# Patient Record
Sex: FEMALE | Race: WHITE
[De-identification: names, ages, dates, MRNs, and addresses within clinical notes are randomized per-mention and may not be internally consistent; named-entity substitution may affect disease eponyms.]

---

## 2017-04-08 NOTE — ED HEADACHE
General


Chief Complaint:  Cardiac/General Problems


Stated Complaint:  R HEAD PAIN


Nursing Triage Note:  


C/O HIGH BLOOD PRESSURE, RIGHT SIDED HEADACHE X1HR.


Nursing Sepsis Screen:  No Definite Risk


Source:  patient





History of Present Illness


Time seen by provider:  20:15


Initial Comments


PT C/O RIGHT SIDED HEADACHE AND RIGHT LATERAL NECK PAIN SINCE WAKING AT 1500 

TODAY


STATES HER BP /63. TAKES TOPROL 1/2 PILL BID AND TOOK EVENING DOSE ONE 

HOUR AGO


HAS NOT TAKEN ANYTHING FOR HER HEADACHE


NO VISION CHANGES


NO NAUSEA/VOMITING


NO DIZZINESS


NO PARESTHESIAS OR MOTOR DEFICITS


NO FEVER OR RECENT ILLNESS, URI SYMPTOMS, ETC. 


PT STATES SHE JUST GOT BACK FROM A LONG TRIP--WENT TO [place] 4 DAYS AGO, DROVE 8 

HOURS BACK HERE AND GOT HOME AT 0600 THIS AM---HAD BEEN UP FOR 24 HOURS AT THAT 

TIME. PT WENT TO BED AT 0700 WHEN SHE GOT HOME THIS AM, AND WOKE UP AT 1500 

WITH HEADACHE AND RIGHT LATERAL NECK PAIN 


STATES SHE HAS ALSO BEEN UNDER ALOT OF STRESS--ANNIVERSARY OF HER SISTER'S 

DEATH IN [place] A YEAR AGO. SON ALSO COMMITTED SUICIDE ( PT DID NOT STATE WHEN 

THIS OCCURRED) 


PT HAS HAD HEADACHES SIMILAR TO THIS IN  PAST





PCP: DR. ADOLFO ALLEN


ALSO HAS DR IN [place], WHERE SHE LIVES PART OF THE TIME.





Allergies and Home Medications


Allergies


Coded Allergies:  


     hydrocodone (Verified  Allergy, Unknown, 12/4/16)


     morphine (Verified  Allergy, Unknown, 12/4/16)


Uncoded Allergies:  


     MULTIPLE ANTIBIOTICS (Allergy, Unknown, 12/4/16)





Home Medications


Clopidogrel Bisulfate 75 Mg Tablet, #30 (Reported)


Metoprolol Tartrate 50 Mg Tablet, #60 (Reported)





Constitutional:  no symptoms reported, No dizziness, No fever


Eyes:  No Symptoms Reported


Ears, Nose, Mouth, Throat:  no symptoms reported


Respiratory:  no symptoms reported


Cardiovascular:  no symptoms reported


Gastrointestinal:  no symptoms reported, No nausea, No vomiting


Genitourinary:  no symptoms reported


Musculoskeletal:  see HPI


Skin:  no symptoms reported


Psychiatric/Neurological:  See HPI, Anxiety, Depressed, Emotional Problems, 

Headache





Past Medical-Social-Family Hx


Patient Social History


Alcohol Use:  Denies Use


Recreational Drug Use:  No


Smoking Status:  Current Everyday Smoker (1 PPD)


Type Used:  Cigarettes


2nd Hand Smoke Exposure:  Yes


Recent Foreign Travel:  No


Contact w/Someone Who Travel:  No


Recent Infectious Disease Expo:  No


Recent Hopitalizations:  No





Immunizations Up To Date


Tetanus Booster (TDap):  Unknown





Seasonal Allergies


Seasonal Allergies:  No





Surgeries


HX Surgeries:  Yes (BILATERAL ILIAC STENTS AND AORTIC STENT; ; BREAST BIOPSY;  

L KNEE)


Surgeries:  Appendectomy, Breast, Orthopedic, Vascular Surgery





Respiratory


Hx Respiratory Disorders:  No





Cardiovascular


Hx Cardiac Disorders:  Yes (CAROTID DISEASE; ILIAC AND AORTIC STENTS)


Cardiac Disorders:  Coronary Artery Disease, High Cholesterol, Hypertension, 

Peripheral Vascular





Neurological


Hx Neurological Disorders:  Yes (occasional headaches.)


Neurological Disorders:  Headaches /Migraines





Reproductive System


Pregnant:  No


GYN History:  Menopausal





Genitourinary


Hx Genitourinary Disorders:  No





Gastrointestinal


Hx Gastrointestinal Disorders:  No





Musculoskeletal


Hx Musculoskeletal Disorders:  No





Endocrine


Hx Endocrine Disorders:  No





HEENT


HX ENT Disorders:  Yes


HEENT Disorders:  Cataract





Cancer


Hx Cancer:  No





Psychosocial


Hx Psychiatric Problems:  No





Integumentary


HX Skin/Integumentary Disorder:  No





Blood Transfusions


Hx Blood Disorders:  No





Family Medical History


Significant Family History:  No Pertinent Family Hx





Physical Exam


Vital Signs





Vital Sign - Last 12Hours








 4/8/17





 20:16


 


Temp 97.9


 


Pulse 90


 


Resp 18


 


B/P (MAP) 195/94


 


Pulse Ox 96


 


O2 Delivery Room Air





Capillary Refill : Less Than 3 Seconds


General Appearance:  WD/WN, no apparent distress, other (ANXIOUS; REEKS OF 

CIGARETTES)


HEENT:  PERRL/EOMI, normal ENT inspection (EXCEPT POOR DENTITION, MISSING TEETH)

, TMs normal, pharynx normal


Neck:  full range of motion, tender lateral (RIGHT LATERAL NECK MUSCLE 

TENDERNESS--PALPATION REPRODUCES PAIN )


Cardiovascular:  normal peripheral pulses, regular rate, rhythm, no JVD, no 

murmur


Respiratory:  normal breath sounds, no respiratory distress, no accessory 

muscle use


Gastrointestinal:  normal bowel sounds, non tender, soft


Back:  normal inspection, no CVA tenderness, no vertebral tenderness


Extremities:  normal range of motion, non-tender, normal inspection, no pedal 

edema, no calf tenderness, normal capillary refill


Psychiatric:  alert, oriented x 3


Crainal Nerves:  normal hearing, normal speech, PERRL


Coordination/Gait:  normal gait


Motor/Sensory:  no motor deficit, no sensory deficit, no pronator drift


Skin:  normal color, warm/dry





Progress/Results/Core Measures


Results/Orders


My Orders





Orders - ALVAREZ YEAGER DO


Ct Head Wo (4/8/17 20:16)


Clonidine  Tablet (Catapres  Tablet) (4/8/17 20:45)


Acetaminophen  Tablet (Tylenol  Tablet) (4/8/17 21:00)





Medications Given in ED





Current Medications








 Medications  Dose


 Ordered  Sig/Henrietta


 Route  Start Time


 Stop Time Status Last Admin


Dose Admin


 


 Acetaminophen  1,000 mg  ONCE  ONCE


 PO  4/8/17 21:00


 4/8/17 21:01 DC 4/8/17 20:59


1,000 MG


 


 Clonidine HCl  0.2 mg  ONCE  ONCE


 PO  4/8/17 20:45


 4/8/17 20:46 DC 4/8/17 20:36


0.2 MG








Vital Signs/I&O





Vital Sign - Last 12Hours








 4/8/17 4/8/17 4/8/17





 20:16 20:54 21:25


 


Temp 97.9  97.9


 


Pulse 90 72 71


 


Resp 18 16 18


 


B/P (MAP) 195/94 172/72 


 


Pulse Ox 96 95 96


 


O2 Delivery Room Air Room Air 














Blood Pressure Mean:  127








Progress Note :  


Progress Note


PT DECLINES ANY SHOTS OR RX'S--STATES SHE CANNOT TAKE NSAIDS DUE TO BEING ON 

PLAVIX AND SHE HAS HAD AN ALLERGIC REACTION WITH AN UNKNOWN MUSCLE RELAXANT. PT 

STATES THE ONLY THING SHE WANTS IS TYLENOL


PT CALMER, HEADACHE ALMOST GONE--STATES SHE FEELS MUCH BETTER, AT DISMISSAL


BP DOWN /63 PRIOR TO DISMISSAL





Diagnostic Imaging





Comments


CT HEAD--NO ACUTE PROCESS, CHRONIC CHANGES--PER RADIOLOGIST REPORT @ 2040


   Reviewed:  Reviewed by Me





Departure


Impression


Impression:  


 Primary Impression:  


 Tension headache


 Additional Impressions:  


 HTN (hypertension)


 ANXIETY DUE TO STRESS


Disposition:  01 HOME, SELF-CARE


Condition:  Improved





Departure-Patient Inst.


Referrals:  


ADOLFO ALLEN MD (PCP/Family)


Primary Care Physician


Patient Instructions:  Anxiety, Adult (DC), DASH Diet, High Blood Pressure (DC)

, Tension Headache (DC)





Add. Discharge Instructions:  


TAKE TYLENOL AS NEEDED FOR HEADACHE





TAKE YOUR BLOOD PRESSURE MEDICATIONS AS PRESCRIBED





FOLLOW UP WITH DR. ALLEN NEXT WEEK FOR RECHECK





All discharge instructions reviewed with patient and/or family. Voiced 

understanding.











ALVAREZ YEAGER DO Apr 8, 2017 20:41

## 2017-04-08 NOTE — DIAGNOSTIC IMAGING REPORT
INDICATION: Right-sided headaches



Noncontrast brain CT is performed with comparison to 12/4/16.



There are mild atrophic changes. There were no extra-axial fluid

collections. No intracranial hemorrhage. No intracranial mass or

mass effect. No midline shift. The ventricles are normal in size

and position. There is no acute parenchymal abnormality in the

brain. Calvarial windows were unremarkable.



IMPRESSION:



Mild chronic changes with no acute intracranial abnormality.



Dictated by:



Dictated on workstation # [id number]

## 2017-05-14 NOTE — XMS REPORT
[facility]

 Created on: 2017



Cora Chun

External Reference #: [id number]

: 1955

Sex: Female



Demographics







 Address  [address] [place] [address]  [id number]

 

 Preferred Language  Unknown

 

 Marital Status  Unknown

 

 Christian Affiliation  Unknown

 

 Race  Unknown

 

 Ethnic Group  Unknown





Author







 Author  ROSALINE ORR

 

 Organization  [facility] [place] [facility]

 

 Address  [address] [place]  [address]



 

 Phone  [phone]







Care Team Providers







 Care Team Member Name  Role  Phone

 

 ROSALINE ORR  Unavailable  [phone]







PROBLEMS







 Type  Condition  ICD9-CM Code  SCK54-YS Code  Onset Dates  Condition Status  
SNOMED Code

 

 Assessment  Contact dermatitis due to plants, except food, unspecified contact 
dermatitis type     L25.5  15 Sep, 2016  Active  [id number]







ALLERGIES







 Substance  Reaction  Event Type  Date  Status

 

 Valium  Unknown  Drug Allergy  15 Sep, 2016  Active

 

 SulfADIAZINE  Unknown  Drug Allergy  15 Sep, 2016  Active

 

 Pravastatin Sodium  Unknown  Drug Allergy  15 Sep, 2016  Active

 

 Penicillin G Sodium  Unknown  Drug Allergy  15 Sep, 2016  Active







SOCIAL HISTORY

No smoking Hx information available



PLAN OF CARE





VITAL SIGNS







 Height  63 in  2016-09-15

 

 Weight  174.4 lbs  2016-09-15

 

 Heart Rate  84 bpm  2016-09-15

 

 Respiratory Rate  22   2016-09-15

 

 BMI  30.89 kg/m2  2016-09-15

 

 Blood pressure systolic  150 mmHg  2016-09-15

 

 Blood pressure diastolic  78 mmHg  2016-09-15







MEDICATIONS







 Medication  Instructions  Dosage  Frequency  Start Date  End Date  Duration  
Status

 

 Metoprolol Succinate ER 50 MG  Orally Once a day  1 tablet  24h           
Active

 

 PredniSONE 20 MG  Orally 2 tablets daily x 5 days  1 tablet     15 Sep, 2016  
20 Sep, 2016  5 days  Active

 

 Plavix 75 MG  Orally Once a day  1 tablet  24h           Active

 

 Triamcinolone Acetonide 0.1 %  Externally Twice a day  1 application to 
affected area  12h  15 Sep, 2016     5 days  Active







RESULTS

No Results



PROCEDURES







 Procedure  Date Ordered  Related Diagnosis  Body Site

 

 Office Visit, New Pt., Level 3  Sept 15, 2016      







IMMUNIZATIONS

No Known Immunizations

## 2017-08-18 NOTE — DIAGNOSTIC IMAGING REPORT
INDICATION: Overdose.



TIME OF EXAM: 2022 hrs.



COMPARISON: No previous study is available for comparison at this

time.



FINDINGS: Heart size and pulmonary vasculature are within normal

limits, and the lungs are clear, bilaterally.  



IMPRESSION: Unremarkable chest.



Dictated by: 



  Dictated on workstation # [id number]

## 2017-08-18 NOTE — ED SYNCOPE
General


Chief Complaint:  Dizziness/Syncope


Stated Complaint:  BLACKED OUT


Nursing Triage Note:  


Ambulatory to ED 10 with family [facility]. [facility] reports that she was sitting down, noticed that she started not 


feeling well and "blacked out, but came right back to." Patient reports that 

she 


remembers all events and denies hitting her head. Patient reports that she was 


able to ambulate after and went back home with family, but wanted to get 

checked 


out. Patient reports feeling somewhat normal now.


Source of Information:  Patient, Old Records


Exam Limitations:  No Limitations





History of Present Illness


Time Seen by Provider:  19:45


Initial Comments


This 61-year-old woman presents to the emergency room with a near syncopal 

episode while at the [facility] tonight 20-30 minutes after taking her metoprolol 

dose.  She normally takes a half tablet and reports the tablet she took this 

evening was larger than one half.  It was approximately three fourths of a 

tablet.  She is sensitive to medications and wonders if this larger dose may 

have caused the symptoms.  She also reports eating poorly today, eating only 

one and a half donuts earlier in the day.  She denies diabetes or problems with 

blood sugar.  She feels much better now and is asymptomatic.  She was also 

feeling well prior to the episode.  She did not actually lose consciousness but 

came close.  She bumped her head on the screen in front of her but denies any 

head injury.  She does comment about having chest pain intermittently over the 

past 10 years on the right side.  She relates this to COPD.  She denies any 

change and denies chest pain at present.  She does have a history of carotid 

stenosis which she gets checked every 6 months.  She is actually due for repeat 

scans next week.  She reports her last cardiac catheter was 3 years ago in 

[place] without interventions.  She has a continuous tobacco user.  She has a 

vasculopath who has aortic and iliac stents.  Dr. Shashank Allen is her primary 

care provider.





Allergies and Home Medications


Allergies


Coded Allergies:  


     hydrocodone (Verified  Allergy, Unknown, 16)


     morphine (Verified  Allergy, Unknown, 16)


Uncoded Allergies:  


     MULTIPLE ANTIBIOTICS (Allergy, Unknown, 16)





Home Medications


Clopidogrel Bisulfate 75 Mg Tablet, #30 (Reported)


Metoprolol Tartrate 50 Mg Tablet, #60 (Reported)





Constitutional:  no symptoms reported


EENTM:  no symptoms reported


Respiratory:  see HPI


Cardiovascular:  see HPI


Gastrointestinal:  no symptoms reported


Genitourinary:  no symptoms reported


Pregnant:  No


Musculoskeletal:  no symptoms reported


Skin:  no symptoms reported


Psychiatric/Neurological:  See HPI





Past Medical-Social-Family Hx


Patient Social History


Alcohol Use:  Denies Use


Recreational Drug Use:  No


Smoking Status:  Current Everyday Smoker


Type Used:  Cigarettes


2nd Hand Smoke Exposure:  Yes


Recent Foreign Travel:  No


Contact w/Someone Who Travel:  No


Recent Infectious Disease Expo:  No


Recent Hopitalizations:  No





Immunizations Up To Date


Tetanus Booster (TDap):  Unknown





Seasonal Allergies


Seasonal Allergies:  No





Surgeries


HX Surgeries:  Yes (iliac and aortic stentsBILATERAL ILIAC STENTS AND AORTIC 

STENT; ; BREAST BIOPSY;  L KNEE)


Surgeries:  Appendectomy, Breast, Cardiac (cardiac catheterization), Orthopedic

, Vascular Surgery





Respiratory


Hx Respiratory Disorders:  Yes (tobaccoism)





Cardiovascular


Hx Cardiac Disorders:  Yes (CAROTID DISEASE; ILIAC AND AORTIC STENTS)


Cardiac Disorders:  Coronary Artery Disease, High Cholesterol, Hypertension, 

Peripheral Vascular





Neurological


Hx Neurological Disorders:  Yes (occasional headaches.)


Neurological Disorders:  Headaches /Migraines





Reproductive System


GYN History:  Menopausal





Genitourinary


Hx Genitourinary Disorders:  No





Gastrointestinal


Hx Gastrointestinal Disorders:  No





Musculoskeletal


Hx Musculoskeletal Disorders:  No





Endocrine


Hx Endocrine Disorders:  No





HEENT


HX ENT Disorders:  Yes


HEENT Disorders:  Cataract





Cancer


Hx Cancer:  No





Psychosocial


Hx Psychiatric Problems:  No





Integumentary


HX Skin/Integumentary Disorder:  No





Blood Transfusions


Hx Blood Disorders:  No





Family Medical History


Significant Family History:  No Pertinent Family Hx





Physical Exam


Vital Signs





Vital Sign - Last 12Hours








 /





 19:29


 


Temp 98.5


 


Pulse 75


 


Resp 16


 


B/P (MAP) 192/89


 


Pulse Ox 96


 


O2 Delivery Room Air





Capillary Refill : Less Than 3 Seconds


General Appearance:  No Apparent Distress


HEENT:  PERRL/EOMI, Normal ENT Inspection


Neck:  Normal Inspection, No Carotid Bruit


Cardiovascular:  Regular Rate, Rhythm, No Edema, No Murmur, Normal Peripheral 

Pulses, No JVD


Respiratory:  Chest Non Tender, Lungs Clear, Normal Breath Sounds, No Accessory 

Muscle Use, No Respiratory Distress


Gastrointestinal:  Normal Bowel Sounds, Non Tender, Soft


Extremities:  Normal Inspection, No Pedal Edema


Neurologic/Psychiatric:  Alert, Oriented x3, No Motor/Sensory Deficits, Normal 

Mood/Affect, CNs II-XII Norm as Tested


Cranial Nerves:  Normal Hearing, Normal Speech, PERRL


Coordination/Gait:  Normal Gait


Motor/Sensory:  No Motor Deficit, No Sensory Deficit, No Pronator Drift


Skin:  Normal Color, Warm/Dry





Progress/Results/Core Measures


Results/Orders


Lab Results





Laboratory Tests








Test


  17


20:03 17


20:15 Range/Units


 


 


Urine Color YELLOW    


 


Urine Clarity CLEAR    


 


Urine pH 6.5   5-9  


 


Urine Specific Gravity 1.010 L  1.016-1.022  


 


Urine Protein NEGATIVE   NEGATIVE  


 


Urine Glucose (UA) NEGATIVE   NEGATIVE  


 


Urine Ketones NEGATIVE   NEGATIVE  


 


Urine Nitrite NEGATIVE   NEGATIVE  


 


Urine Bilirubin NEGATIVE   NEGATIVE  


 


Urine Urobilinogen NORMAL   NORMAL  MG/DL


 


Urine Leukocyte Esterase NEGATIVE   NEGATIVE  


 


Urine RBC (Auto) NEGATIVE   NEGATIVE  


 


Urine RBC NONE    /HPF


 


Urine WBC NONE    /HPF


 


Urine Squamous Epithelial


Cells RARE 


  


   /HPF


 


 


Urine Crystals NONE    /LPF


 


Urine Bacteria NONE    /HPF


 


Urine Casts NONE    /LPF


 


Urine Mucus NEGATIVE    /LPF


 


Urine Culture Indicated NO    


 


White Blood Count


  


  11.7 H


  4.3-11.0


10^3/uL


 


Red Blood Count


  


  4.32 L


  4.35-5.85


10^6/uL


 


Hemoglobin  14.1  11.5-16.0  G/DL


 


Hematocrit  42  35-52  %


 


Mean Corpuscular Volume  96  80-99  FL


 


Mean Corpuscular Hemoglobin  33  25-34  PG


 


Mean Corpuscular Hemoglobin


Concent 


  34 


  32-36  G/DL


 


 


Red Cell Distribution Width  13.4  10.0-14.5  %


 


Platelet Count


  


  275 


  130-400


10^3/uL


 


Mean Platelet Volume  9.8  7.4-10.4  FL


 


Neutrophils (%) (Auto)  61  42-75  %


 


Lymphocytes (%) (Auto)  29  12-44  %


 


Monocytes (%) (Auto)  9  0-12  %


 


Eosinophils (%) (Auto)  1  0-10  %


 


Basophils (%) (Auto)  1  0-10  %


 


Neutrophils # (Auto)  7.1  1.8-7.8  X 10^3


 


Lymphocytes # (Auto)  3.4  1.0-4.0  X 10^3


 


Monocytes # (Auto)  1.0  0.0-1.0  X 10^3


 


Eosinophils # (Auto)


  


  0.2 


  0.0-0.3


10^3/uL


 


Basophils # (Auto)


  


  0.1 


  0.0-0.1


10^3/uL


 


Sodium Level  142  135-145  MMOL/L


 


Potassium Level  3.7  3.6-5.0  MMOL/L


 


Chloride Level  108 H   MMOL/L


 


Carbon Dioxide Level  21  21-32  MMOL/L


 


Anion Gap  13  5-14  MMOL/L


 


Blood Urea Nitrogen  9  7-18  MG/DL


 


Creatinine


  


  0.71 


  0.60-1.30


MG/DL


 


Estimat Glomerular Filtration


Rate 


  > 60 


   


 


 


BUN/Creatinine Ratio  13   


 


Glucose Level  95    MG/DL


 


Calcium Level  9.3  8.5-10.1  MG/DL


 


Magnesium Level  2.0  1.8-2.4  MG/DL


 


Total Bilirubin  0.5  0.1-1.0  MG/DL


 


Aspartate Amino Transf


(AST/SGOT) 


  15 


  5-34  U/L


 


 


Alanine Aminotransferase


(ALT/SGPT) 


  13 


  0-55  U/L


 


 


Alkaline Phosphatase  73    U/L


 


Troponin I  < 0.30  <0.30  NG/ML


 


Total Protein  7.6  6.4-8.2  GM/DL


 


Albumin  4.3  3.2-4.5  GM/DL








My Orders





Orders - JOSR ALEXANDRE MD


Chest 1 View, Ap/Pa Only (17 19:52)


Cbc With Automated Diff (17 19:52)


Comprehensive Metabolic Panel (17 19:52)


Magnesium (17 19:52)


Troponin I (17 19:52)


Ua Culture If Indicated (17 19:52)


Saline Lock/Iv-Start (17 19:52)


Ekg Tracing (17 19:52)


Monitor-Rhythm Ecg Trace Only (17 19:52)


Orthostatic Vital Signs (17 19:52)


Us Carotid Bradly Complete 60109 (17 20:49)


Acetaminophen  Tablet (Tylenol  Tablet) (17 22:15)


Acetaminophen  Tablet (Tylenol  Tablet) (17 22:03)





Medications Given in ED





Vital Signs/I&O





Vital Sign - Last 12Hours








 17





 19:29 20:29 22:09 22:11


 


Temp 98.5  98.5 98.5


 


Pulse 75 73  





  72  





  75  


 


Resp 16   


 


B/P (MAP) 192/89   


 


Pulse Ox 96   


 


O2 Delivery Room Air   


 


    





 17   





 22:38   


 


Temp 98.5   


 


Pulse 84   


 


Resp 18   


 


Pulse Ox 98   


 


O2 Delivery Room Air   














Blood Pressure Mean:  123








Progress Note :  


Progress Note


Workup was essentially unremarkable.  Patient had no further episodes of 

syncope or near syncope.  Case was reviewed with Dr. Shashank Allen who agrees 

with discharge home for outpatient follow-up.  Ultrasound showed no progression 

of stenosis.  Patient did remarkable about chest pain but she was very clear 

that this pain has been present intermittently for 10 years and has been worked 

up previously.  She denies any new chest pain.





ECG


Initial ECG Impression Date:  Aug 18, 2017


Initial ECG Impression Time:  20:09


Initial ECG Rate:  74


Initial ECG Rhythm:  Normal Sinus


Initial ECG Intervals:  Normal


Initial ECG Impression:  Normal


Comment


Normal sinus rhythm with no ST elevation or depression.  No abnormal intervals 

or axis deviation.





Diagnostic Imaging





   Diagonstic Imaging:  Xray


   Plain Films/CT/US/NM/MRI:  chest


Comments


Chest x-ray viewed by me and report reviewed.  See report below:





NAME:      TIFFANY ALVAREZ


MED REC#:   [id number]


ACCOUNT#:   [id number]


PT STATUS:   REG ER


:      1955


PHYSICIAN:    JOSR ALEXANDRE MD


ADMIT DATE:   17/ER


***Signed***


Date of Exam:   17





CHEST 1 VIEW, AP/PA ONLY


 


INDICATION: Overdose.





TIME OF EXAM: 2022 hrs.





COMPARISON: No previous study is available for comparison at this


time.





FINDINGS: Heart size and pulmonary vasculature are within normal


limits, and the lungs are clear, bilaterally.  





IMPRESSION: Unremarkable chest.





Dictated by: 





  Dictated on workstation # [id number]





[id number]





Dict:      17


Trans:      17





Interpreted by:         MARIO OVERTON MD


Electronically signed by:   MARIO OVERTON MD 17








   Diagonstic Imaging:  Ultrasound


Comments


Carotid ultrasound discussed with the technician and [facility] report reviewed.  

No significant stenosis on the right.  Stenosis on the left up to 80 percent 

unchanged from prior.





Departure


Impression


Impression:  


 Primary Impression:  


 Near syncope


Disposition:  01 HOME, SELF-CARE


Condition:  Improved





Departure-Patient Inst.


Decision time for Depature:  22:25


Referrals:  


SHASHANK ALLEN MD (PCP/Family)


Primary Care Physician


Patient Instructions:  Syncope (Fainting) (DC)





Add. Discharge Instructions:  


Drink plenty of clear liquids.  Work toward quitting smoking.  Follow-up with 

Dr. Allen as soon as possible.  Keep your appointments for the arterial scans 

next week and see your cardiologist as soon as possible.  Return to the 

emergency room if symptoms worsen.  











All discharge instructions reviewed with patient and/or family. Voiced 

understanding.





Copy


Copies To 1:   SHASHANK ALLEN MD, JOSHUA T MD Aug 18, 2017 22:31

## 2017-08-19 NOTE — DIAGNOSTIC IMAGING REPORT
PROCEDURE: US Carotid Duplex Bilateral.



TECHNIQUE: Multiple real-time grayscale images were obtained over

the carotid arteries in various projections bilaterally.

Additional duplex Doppler and color Doppler images were also

obtained.



INDICATION: Syncope



FINDINGS: Grayscale images demonstrate mild to moderate bilateral

carotid plaques, left greater than right. There are elevated

velocities in the left internal carotid artery up to 209 cm/s.

The ICA/CCA ratio on the left is 2.6. There are no focally

elevated velocities on the right. ICA/CCA ratio on the right is

1.6. There is antegrade flow in both vertebral arteries.



IMPRESSION: Spectral analysis correlates with a 60-79% stenosis

in the left internal carotid artery. Recommend clinical

correlation and if warranted followup with CTA.



No significant stenosis in the right internal carotid artery.



Dictated by: 



  Dictated on workstation # [id number]

## 2017-08-24 NOTE — DIAGNOSTIC IMAGING REPORT
EXAMINATION: Left hip at 11:28 a.m.



INDICATION: Hip pain.



AP and lateral views were obtained. There are no prior studies

available for comparison.



FINDINGS: There is no fracture, dislocation, or acute bony

abnormality evident. However, there is severe degenerative

disease involving the hip joint. Specifically, the superolateral

aspect of the joint space is narrowed, and there is sclerosis of

the opposing surfaces of the femoral head and the acetabulum.

Subchondral cyst formation within the acetabulum is noted as

well.



The soft tissues are unremarkable.



IMPRESSION:

1. There is no evidence for an acute bony abnormality.

2. There is severe degenerative disease involving the hip joint.



Dictated by: 



  Dictated on workstation # [id number]

## 2017-08-24 NOTE — DIAGNOSTIC IMAGING REPORT
EXAMINATION: Left knee at 11:29 p.m.



INDICATION: Knee pain.



Three views were obtained.



FINDINGS: There is no fracture, dislocation, or acute bony

abnormality evident. There is narrowing of the lateral

compartment of the knee joint and the patellofemoral space. The

medial compartment is fairly well maintained. The soft tissues

are unremarkable.



IMPRESSION:

1. There is no evidence for acute bony abnormality.

2. There is degenerative disease involving the lateral

compartment and the patellofemoral space.



Dictated by: 



  Dictated on workstation # [id number]

## 2017-12-10 NOTE — ED NEUROLOGICAL PROBLEM
General


Chief Complaint:  Neuro-Stroke Like Symptoms


Stated Complaint:  R SIDE NECK/HEAD PAIN/R EYE VISION BLURRY


Nursing Triage Note:  


pt reports pain in l side of neck, blurred vision while at the [employer] today. pt 


reports stints in aorta, stomach, L carotid, R and L sides of iliac six weeks 


ago.


Nursing Sepsis Screen:  No Definite Risk


Source:  patient


Exam Limitations:  no limitations





History of Present Illness


Time seen by provider:  12:02


Initial Comments


This 61-year-old woman presents to the emergency room with complaints of right-

sided blurry vision and pain in the right neck that radiates up toward the ear.

  Symptoms started about 35 minutes prior to arrival (about 11:00) while she 

was playing games at the [employer].  Her symptoms have improved some now but she 

still has some slight blurry vision.  She is uncertain how different this is 

then her chronic because she has cataracts and requires glasses.  She is not 

wearing her glasses at present.  She denies any field of vision changes.  She 

had a carotid stent placed about 6 weeks ago on the left.  Stroke activation 

was paged after my assessment as patient had right-sided blurry vision and a 

recent placement of a stent on the left.  Patient reports good compliance with 

her Plavix but she held aspirin last week due to GI upset.





Allergies and Home Medications


Allergies


Coded Allergies:  


     iodine (Verified  Allergy, Severe, 12/10/17)


     hydrocodone (Verified  Allergy, Unknown, 16)


     morphine (Verified  Allergy, Unknown, 16)


Uncoded Allergies:  


     IV DYE (Allergy, Severe, 12/10/17)


     MULTIPLE ANTIBIOTICS (Allergy, Unknown, 16)





Home Medications


Aspirin 81 Mg Tablet.dr, 81 MG PO, (Reported)


Clopidogrel Bisulfate 75 Mg Tablet, #30 (Reported)


Lisinopril 10 Mg Tablet, 10 MG PO DAILY, (Reported)


Metoprolol Tartrate 50 Mg Tablet, #60 (Reported)





Constitutional:  no symptoms reported


Eyes:  See HPI


Ears, Nose, Mouth, Throat:  no symptoms reported


Respiratory:  no symptoms reported


Cardiovascular:  see HPI


Gastrointestinal:  see HPI


Genitourinary:  no symptoms reported


Pregnant:  No


Musculoskeletal:  no symptoms reported


Skin:  no symptoms reported


Psychiatric/Neurological:  See HPI


Endocrine:  No Symptoms Reported


Hematologic/Lymphatic:  No Symptoms Reported





Past Medical-Social-Family Hx


Patient Social History


Alcohol Use:  Denies Use


Recreational Drug Use:  No


Smoking Status:  Current Everyday Smoker


Type Used:  Cigarettes


2nd Hand Smoke Exposure:  Yes


Recent Foreign Travel:  No


Contact w/Someone Who Travel:  No


Recent Infectious Disease Expo:  No


Recent Hopitalizations:  No


Physical Abuse:  No


Sexual Abuse:  No


Mistreated:  No


Fear:  No





Immunizations Up To Date


Tetanus Booster (TDap):  Unknown





Seasonal Allergies


Seasonal Allergies:  No





Surgeries


History of Surgeries:  Yes (BILATERAL ILIAC STENTS AND AORTIC STENT; ; BREAST 

BIOPSY;  L KNEE)


Surgeries:  Appendectomy, Breast, Cardiac, Orthopedic, Vascular Surgery (left 

CEA)





Respiratory


History of Respiratory Disorde:  Yes (tobaccoism)





Cardiovascular


History of Cardiac Disorders:  Yes (CAROTID DISEASE; ILIAC AND AORTIC STENTS)


Cardiac Disorders:  Coronary Artery Disease, High Cholesterol, Hypertension, 

Peripheral Vascular (carotid stenosis)





Neurological


History of Neurological Disord:  Yes (occasional headaches.)


Neurological Disorders:  Headaches /Migraines





Reproductive System


GYN History:  Menopausal





Genitourinary


History of Genitourinary Disor:  No





Gastrointestinal


History of Gastrointestinal Di:  No





Musculoskeletal


History of Musculoskeletal Dis:  No





Endocrine


History of Endocrine Disorders:  No





HEENT


History of HEENT Disorders:  Yes


HEENT Disorders:  Cataract





Cancer


History of Cancer:  No





Psychosocial


History of Psychiatric Problem:  No


Suicide Risk Score:  0





Integumentary


History of Skin or Integumenta:  No





Blood Transfusions


History of Blood Disorders:  No





Family Medical History


Significant Family History:  No Pertinent Family Hx





Physical Exam


Vital Signs





Vital Sign - Last 12Hours








 12/10/17





 12:00


 


Temp 98.3


 


Pulse 78


 


Resp 13


 


B/P (MAP) 179/86 (117)


 


Pulse Ox 99





Capillary Refill : Less Than 3 Seconds


General Appearance:  WD/WN, no apparent distress


HEENT:  PERRL/EOMI, normal ENT inspection, TMs normal, pharynx normal


Neck:  normal inspection


Respiratory:  lungs clear, normal breath sounds, no respiratory distress, no 

accessory muscle use


Cardiovascular:  regular rate, rhythm, no edema, no murmur


Gastrointestinal:  normal bowel sounds, non tender, soft


Extremities:  normal inspection, no pedal edema


Neurologic/Psychiatric:  CNs II-XII nml as tested, no motor/sensory deficits, 

alert, normal mood/affect, oriented x 3


Crainal Nerves:  normal hearing, normal speech, PERRL, other (peripheral vision 

is intact bilaterally with no field of vision deficits.)


Coordination/Gait:  normal finger to nose, normal gait


Motor/Sensory:  no motor deficit


Skin:  normal color, warm/dry





Stroke


Onset of Symptoms


Date of Onset of Symptoms:  Dec 10, 2017





NIH Stroke Scale Assessment





 Level of Consciousness: 0=Alert (0), Level of Consciousness-Questions: 0=

Answers both month/age (0), LOC Commands: 0=Performs both tasks (0), Visual 

Fields: 0=No visual loss (0), Facial Movement (Facial Paresis): 0=Normal 

symmetrical mnt (0), Motor Function-Arms Right: 0=No drift (0), Motor Function-

Arms Left: 0=No drift (0), Motor Function-Legs Right: 0=No drift (0), Motor 

Function-Legs Left: 0=No drift (0), Limb Ataxia: 0=Absent (0), Sensory: 0=Normal

:no loss (0), Best Language: 0=No aphasia (0), Dysarthria: 0=Normal (0), 

Extinction & Inattention: 0=No abnormality (0), Total: 0





Stroke Thrombolytic Exclusion


Age 18 or Over:  Yes


Intracranial Neoplasm/Aneurysm:  No


Recent CPR:  No


Diabetic Hemorrhagic Retinopat:  No


Recent Obstetric Delivery:  No


Significant Hepatic Dysfunctio:  No


Improving Symptoms:  No





Progress/Results/Core Measures


Results/Orders


Lab Results





Laboratory Tests








Test


  12/10/17


12:20 12/10/17


13:07 Range/Units


 


 


White Blood Count


  8.0 


  


  4.3-11.0


10^3/uL


 


Red Blood Count


  4.27 L


  


  4.35-5.85


10^6/uL


 


Hemoglobin 13.7   11.5-16.0  G/DL


 


Hematocrit 41   35-52  %


 


Mean Corpuscular Volume 97   80-99  FL


 


Mean Corpuscular Hemoglobin 32   25-34  PG


 


Mean Corpuscular Hemoglobin


Concent 33 


  


  32-36  G/DL


 


 


Red Cell Distribution Width 13.5   10.0-14.5  %


 


Platelet Count


  282 


  


  130-400


10^3/uL


 


Mean Platelet Volume 9.5   7.4-10.4  FL


 


Neutrophils (%) (Auto) 36 L  42-75  %


 


Lymphocytes (%) (Auto) 51 H  12-44  %


 


Monocytes (%) (Auto) 10   0-12  %


 


Eosinophils (%) (Auto) 2   0-10  %


 


Basophils (%) (Auto) 1   0-10  %


 


Neutrophils # (Auto) 2.9   1.8-7.8  X 10^3


 


Lymphocytes # (Auto) 4.1 H  1.0-4.0  X 10^3


 


Monocytes # (Auto) 0.8   0.0-1.0  X 10^3


 


Eosinophils # (Auto)


  0.2 


  


  0.0-0.3


10^3/uL


 


Basophils # (Auto)


  0.1 


  


  0.0-0.1


10^3/uL


 


Prothrombin Time 12.7   12.2-14.7  SEC


 


INR Comment 0.9   0.8-1.4  


 


Activated Partial


Thromboplast Time 29 


  


  24-35  SEC


 


 


D-Dimer


  0.86 H


  


  0.00-0.49


UG/ML


 


Sodium Level 140   135-145  MMOL/L


 


Potassium Level 4.0   3.6-5.0  MMOL/L


 


Chloride Level 107     MMOL/L


 


Carbon Dioxide Level 22   21-32  MMOL/L


 


Anion Gap 11   5-14  MMOL/L


 


Blood Urea Nitrogen 10   7-18  MG/DL


 


Creatinine


  0.70 


  


  0.60-1.30


MG/DL


 


Estimat Glomerular Filtration


Rate > 60 


  


   


 


 


BUN/Creatinine Ratio 14    


 


Glucose Level 90     MG/DL


 


Calcium Level 8.9   8.5-10.1  MG/DL


 


Total Bilirubin 0.4   0.1-1.0  MG/DL


 


Aspartate Amino Transf


(AST/SGOT) 14 


  


  5-34  U/L


 


 


Alanine Aminotransferase


(ALT/SGPT) 9 


  


  0-55  U/L


 


 


Alkaline Phosphatase 75     U/L


 


Troponin I < 0.30   <0.30  NG/ML


 


Total Protein 7.3   6.4-8.2  GM/DL


 


Albumin 4.0   3.2-4.5  GM/DL


 


Urine Color  YELLOW   


 


Urine Clarity  CLEAR   


 


Urine pH  5  5-9  


 


Urine Specific Gravity  1.010 L 1.016-1.022  


 


Urine Protein  NEGATIVE  NEGATIVE  


 


Urine Glucose (UA)  NEGATIVE  NEGATIVE  


 


Urine Ketones  NEGATIVE  NEGATIVE  


 


Urine Nitrite  NEGATIVE  NEGATIVE  


 


Urine Bilirubin  NEGATIVE  NEGATIVE  


 


Urine Urobilinogen  NORMAL  NORMAL  MG/DL


 


Urine Leukocyte Esterase  NEGATIVE  NEGATIVE  


 


Urine RBC (Auto)  NEGATIVE  NEGATIVE  


 


Urine RBC  NONE   /HPF


 


Urine WBC  NONE   /HPF


 


Urine Squamous Epithelial


Cells 


  5-10 


   /HPF


 


 


Urine Crystals  NONE   /LPF


 


Urine Bacteria  NEGATIVE   /HPF


 


Urine Casts  NONE   /LPF


 


Urine Mucus  NEGATIVE   /LPF


 


Urine Culture Indicated  NO   








My Orders





Orders - JOSR ALEXANDRE MD


Cbc With Automated Diff (12/10/17 12:22)


Protime With Inr (12/10/17 12:22)


Partial Thromboplastin Time (12/10/17 12:22)


Comprehensive Metabolic Panel (12/10/17 12:22)


Fibrin Degradation Products (12/10/17 12:22)


Troponin I (12/10/17 12:22)


Ua Culture If Indicated (12/10/17 12:22)


Ekg Tracing (12/10/17 12:22)


Nothing By Mouth (12/10/17 Dinner)


Accucheck Stat ONCE (12/10/17 12:22)


Saline Lock/Iv-Start (12/10/17 12:22)


Saline Lock/Iv-Start (12/10/17 12:22)


Vital Signs - Stroke Q15M (12/10/17 12:22)


O2 (12/10/17 12:22)


Intake & Output 06,14,22 (12/10/17 12:22)


Monitor-Rhythm Ecg Trace Only (12/10/17 12:22)


Dysphagia Screening Tool (12/10/17 12:22)


Ketorolac Injection (Toradol Injection) (12/10/17 13:30)


Orphenadrine Injection (Norflex Injectio (12/10/17 13:30)





Vital Signs/I&O





Vital Sign - Last 12Hours








 12/10/17 12/10/17





 12:00 12:30


 


Temp 98.3 


 


Pulse 78 78


 


Resp 13 13


 


B/P (MAP) 179/86 (117) 179/86


 


Pulse Ox 99 99














Blood Pressure Mean:  117











ECG


Initial ECG Impression Date:  Dec 10, 2017


Initial ECG Impression Time:  12:20


Initial ECG Rate:  75


Initial ECG Rhythm:  Normal Sinus


Initial ECG Intervals:  Normal


Initial ECG Impression:  Normal


Comment


Normal sinus rhythm with no ST elevation or depression.  No abnormal intervals 

or axis deviation.





Diagnostic Imaging





   Diagonstic Imaging:  CT


   Plain Films/CT/US/NM/MRI:  head


Comments


CT head and neck viewed by me and report reviewed.  See report below:





NAME:   TIFFANY ALVAREZ


MED REC#:   [id number]


ACCOUNT#:   [id number]


PT STATUS:   REG ER


:   1955


PHYSICIAN:   TEVIN SANCHEZ


ADMIT DATE:   12/10/17/ER


   ***Draft***


Date of Exam:12/10/17





CT HEAD/NECK WO





PROCEDURE: CT head and neck without contrast.





TECHNIQUE: Contiguous axial images were obtained from the skull


base through the vertex. Noncontrast axial images were then


obtained of the soft tissue of the neck.





INDICATION: Headache. Visual changes. Carotid stent placement 6


weeks ago. 





COMPARISON: CT head without contrast 2017.





FINDINGS: 





CT head:





No intracranial hemorrhage, mass effect, hydrocephalus or


extra-axial fluid collections. No CT evidence of acute


infarction. Mild generalized cerebral and cerebellar parenchymal


volume loss is age appropriate. No fractures. The paranasal


sinuses and mastoids are clear.





CT cervical spine:





Reversal of the normal cervical lordosis. Alignment is otherwise


unremarkable. There are mild degenerative endplate changes which


are more moderate at C5-C6. Mild facet arthropathy. No high-grade


spinal canal or neuroforaminal narrowing. Vertebral body heights


are preserved. No fractures. Vascular stent at the left carotid


bifurcation. Mild atherosclerotic calcifications in the right


carotid bifurcation. The visualized paravertebral soft tissues


are otherwise unremarkable.





IMPRESSION: 


1. No acute intracranial CT findings.


2. Reversal of the normal cervical lordosis may be positional or


due to muscle spasm. No evidence of cervical spine fracture.





  Dictated on workstation # [id number]





Dict:   12/10/17 1218


Trans:   12/10/17 1227


[facility] [id number]





Interpreted by:     CASSIA LONGO MD








   Diagonstic Imaging:  Ultrasound


Comments


NAME:   TIFFANY ALVAREZ


[facility] REC#:   [id number]


ACCOUNT#:   [id number]


PT STATUS:   REG ER


:   1955


PHYSICIAN:   TEVIN SANCHEZ


ADMIT DATE:   12/10/17/ER


   ***Draft***


Date of Exam:12/10/17





US CAROTID JAVIER COMPLETE [id number]








PROCEDURE: US Carotid Duplex Bilateral.





TECHNIQUE: Multiple real-time grayscale images were obtained over


the carotid arteries in various projections bilaterally.


Additional duplex Doppler and color Doppler images were also


obtained.





INDICATION: Visual disturbance in the right eye. Recent stent


placement in the left ICA. 





COMPARISON: Carotid Doppler 2017.





FINDINGS: 





Right: Atherosclerotic plaque appears to result in less than 50%


narrowing of the right internal carotid artery origin. Peak


systolic velocities correspond to a 60-79% stenosis. Antegrade


flow within the right vertebral artery.





Left: The left internal carotid artery origin stent is in place


and is widely patent. There is borderline elevated velocities in


the distal left internal carotid artery. It is unclear if this is


within the stent or distal to it. Antegrade flow in the left


vertebral artery.





IMPRESSION: 


1. Findings in the right carotid artery origin correspond to


60-79% stenosis.


2. The left internal carotid artery origins stent appears widely


patent. There are borderline peak systolic velocities in the


distal internal carotid artery. The ICA to CCA ratio remains


within normal limits.





  Dictated on workstation # [id number]








Dict:   12/10/17 1258


Trans:   12/10/17 1313


[facility] [id number]





Interpreted by:     CASSIA LONGO MD





Departure


Impression


Impression:  


 Primary Impression:  


 Strain of right trapezius muscle


 Qualified Codes:  S46.811A - Strain of other muscles, fascia and tendons at 

shoulder and upper arm level, right arm, initial encounter


 Additional Impressions:  


 Change in vision


 Carotid artery stenosis


 Qualified Codes:  I65.21 - Occlusion and stenosis of right carotid artery


Disposition:  01 HOME, SELF-CARE


Condition:  Improved





Departure-Patient Inst.


Decision time for Depature:  13:39


Referrals:  


ADOLFO ALLEN MD (PCP/Family)


Primary Care Physician


Patient Instructions:  Carotid Artery Stenosis (DC)





Add. Discharge Instructions:  


Continue to follow up with your doctor for regular scans of your carotid 

arteries.  Work toward smoking cessation.  Talk with your doctor about helping 

with quitting smoking.  See your eye doctor as soon as possible to evaluate 

your vision.  Return to the emergency room promptly or call 911 if you have any 

worsening of symptoms or any symptoms that could be related to stroke including 

confusion, difficulty speaking, drooping of the face, weakness or numbness, or 

any other unusual symptoms.  You may take Tylenol (acetaminophen) up to 1000 mg 

every 6 hours for your shoulder pain.  Gentle heat or icing in 20 minute 

intervals a be helpful in reducing the pain and tension in your shoulder 

muscles.








All discharge instructions reviewed with patient and/or family. Voiced 

understanding.





Copy


Copies To 1:   ADOLFO ALLEN MD, JOSHUA T MD Dec 10, 2017 13:43

## 2017-12-10 NOTE — DIAGNOSTIC IMAGING REPORT
PROCEDURE: US Carotid Duplex Bilateral.



TECHNIQUE: Multiple real-time grayscale images were obtained over

the carotid arteries in various projections bilaterally.

Additional duplex Doppler and color Doppler images were also

obtained.



INDICATION: Visual disturbance in the right eye. Recent stent

placement in the left ICA. 



COMPARISON: Carotid Doppler 8/18/2017.



FINDINGS: 



Right: Atherosclerotic plaque appears to result in less than 50%

narrowing of the right internal carotid artery origin. Peak

systolic velocities correspond to a 60-79% stenosis. Antegrade

flow within the right vertebral artery.



Left: The left internal carotid artery origin stent is in place

and is widely patent. There is borderline elevated velocities in

the distal left internal carotid artery. It is unclear if this is

within the stent or distal to it. Antegrade flow in the left

vertebral artery.



IMPRESSION: 

1. Findings in the right carotid artery origin correspond to

60-79% stenosis.

2. The left internal carotid artery origins stent appears widely

patent. There are borderline peak systolic velocities in the

distal internal carotid artery. The ICA to CCA ratio remains

within normal limits.



Dictated by: 



  Dictated on workstation # [id number]

## 2017-12-10 NOTE — DIAGNOSTIC IMAGING REPORT
PROCEDURE: CT head and neck without contrast.



TECHNIQUE: Contiguous axial images were obtained from the skull

base through the vertex. Noncontrast axial images were then

obtained of the soft tissue of the neck.



INDICATION: Headache. Visual changes. Carotid stent placement 6

weeks ago. 



COMPARISON: CT head without contrast 4/8/2017.



FINDINGS: 



CT head:



No intracranial hemorrhage, mass effect, hydrocephalus or

extra-axial fluid collections. No CT evidence of acute

infarction. Mild generalized cerebral and cerebellar parenchymal

volume loss is age appropriate. No fractures. The paranasal

sinuses and mastoids are clear.



CT cervical spine:



Reversal of the normal cervical lordosis. Alignment is otherwise

unremarkable. There are mild degenerative endplate changes which

are more moderate at C5-C6. Mild facet arthropathy. No high-grade

spinal canal or neuroforaminal narrowing. Vertebral body heights

are preserved. No fractures. Vascular stent at the left carotid

bifurcation. Mild atherosclerotic calcifications in the right

carotid bifurcation. The visualized paravertebral soft tissues

are otherwise unremarkable.



IMPRESSION: 

1. No acute intracranial CT findings.

2. Reversal of the normal cervical lordosis may be positional or

due to muscle spasm. No evidence of cervical spine fracture.



Dictated by: 



  Dictated on workstation # [id number]

## 2018-02-09 NOTE — DIAGNOSTIC IMAGING REPORT
INDICATION: Felt something burst in the chest. Pain.



FINDINGS: Single view of the chest shows normal heart size and

vascularity. The lungs are clear. There is no effusion or

pneumothorax. There is no bony abnormality.



IMPRESSION: No acute abnormality is seen with no change from

8/18/17.



Dictated by: 



  Dictated on workstation # [id number]

## 2018-02-09 NOTE — ED ABDOMINAL PAIN
General


Chief Complaint:  Cardiac/General Problems


Stated Complaint:  CHEST AND ABD PAIN/FELT LIKE SOMETHING BURST


Nursing Triage Note:  


States 30 min ago was watching tv and felt something "burst in chest and it 

felt 


like water running everywhere".  C/O epugastric pain no nauesea but was sob at 


time.  6/10


Sepsis Screen:  No Definite Risk


Source of Information:  Patient





History of Present Illness


Date Seen by Provider:  Feb 9, 2018


Time Seen by Provider:  19:35


Initial Comments


PT ARRIVES VIA POV FROM HOME 


C/O CHEST/UPPER ABDOMEN PAIN --BEGAN 30 MINUTES AGO WHILE WATCHING TV


STATES "IT FELT LIKE SOMETHING BURST AND IT FELT LIKE A SPRINKLER WAS GOING OFF 

INSIDE" 


THOSE SYMPTOMS ARE NOT OCCURRING NOW. 


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 

RADIATES UP TO HER CHEST--OFF AND ON X 1 MONTH. NOT OCCURRING NOW OR EARLIER 


WAS SLIGHTLY SHORT OF BREATH EARLIER, BUT NOT NOW


NO NAUSEA/VOMITING/DIARRHEA/CONSTIPATION


NO URINARY SYMPTOMS


NO FEVER/SWEATS/CHILLS


STATES SHE WAS DIZZY EARLIER, BUT NOT NOW. 


NO HISTORY OF SIMILAR





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 

THEN IN HER LEFT CAROTID 09/2017D





PCP: DR. GARY ALLEN


CARDIOLOGIST: IN [place]





Allergies and Home Medications


Allergies


Coded Allergies:  


     iodine (Verified  Allergy, Severe, 2/9/18)


     hydrocodone (Verified  Allergy, Unknown, 2/9/18)


     morphine (Verified  Allergy, Unknown, 2/9/18)


Uncoded Allergies:  


     IV DYE (Allergy, Severe, 12/10/17)


     MULTIPLE ANTIBIOTICS (Allergy, Unknown, 12/4/16)





Home Medications


Aspirin 81 Mg Tablet., 81 MG PO, (Reported)


Clopidogrel Bisulfate 75 Mg Tablet, #30 (Reported)


Dicyclomine HCl 20 Mg Tablet, 20 MG PO Q6H, #20


   Prescribed by: ALVAREZ YEAGER on 2/9/18 2058


Hyoscyamine Sulfate 0.125 Mg Tab.subl, 1-2 TAB SL Q4H, #15


   Prescribed by: ALVAREZ YEAGER on 2/9/18 2058


Lisinopril 10 Mg Tablet, 10 MG PO DAILY, (Reported)


Metoprolol Tartrate 50 Mg Tablet, #60 (Reported)


Pantoprazole Sodium 40 Mg Tablet., 40 MG PO DAILY, #15


   Prescribed by: ALVAREZ YEAGER on 2/9/18 2058





Review of Systems


Constitutional:  see HPI, No chills, No diaphoresis, dizziness, No fever, No 

malaise, No weakness


EENTM:  No Symptoms Reported


Respiratory:  See HPI


Cardiovascular:  See HPI


Gastrointestinal:  See HPI


Genitourinary:  No Symptoms Reported


Musculoskeletal:  no symptoms reported


Skin:  no symptoms reported


Psychiatric/Neurological:  Anxiety, Denies Headache, Denies Numbness, Denies 

Paresthesia, Denies Seizure, Denies Tingling, Denies Weakness


Endocrine:  No Symptoms Reported


Hematologic/Lymphatic:  No Symptoms Reported





Past Medical-Social-Family Hx


Patient Social History


Alcohol Use:  Denies Use


Recreational Drug Use:  No


Smoking Status:  Current Everyday Smoker (2 PPD)


Type Used:  Cigarettes (2 PPD)


2nd Hand Smoke Exposure:  Yes


Recent Foreign Travel:  No


Contact w/Someone Who Travel:  No


Recent Infectious Disease Expo:  No


Recent Hopitalizations:  No


Physical Abuse:  No


Sexual Abuse:  No


Mistreated:  No


Fear:  No





Immunizations Up To Date


Tetanus Booster (TDap):  Unknown





Seasonal Allergies


Seasonal Allergies:  No





Surgeries


History of Surgeries:  Yes (BILATERAL ILIAC STENTS AND AORTIC STENT; ; BREAST 

BIOPSY;  L KNEE; LEFT CAROTID STENT 09/2017)


Surgeries:  Appendectomy, Breast, Cardiac, Orthopedic, Vascular Surgery





Respiratory


History of Respiratory Disorde:  Yes (tobaccoism)





Cardiovascular


History of Cardiac Disorders:  Yes (CAROTID DISEASE; ILIAC AND AORTIC STENTS)


Cardiac Disorders:  Coronary Artery Disease, High Cholesterol, Hypertension, 

Peripheral Vascular





Neurological


History of Neurological Disord:  Yes (occasional headaches.)


Neurological Disorders:  Headaches /Migraines





Reproductive System


GYN History:  Menopausal





Genitourinary


History of Genitourinary Disor:  No





Gastrointestinal


History of Gastrointestinal Di:  No





Musculoskeletal


History of Musculoskeletal Dis:  No





Endocrine


History of Endocrine Disorders:  No





HEENT


History of HEENT Disorders:  Yes


HEENT Disorders:  Cataract





Cancer


History of Cancer:  No





Psychosocial


History of Psychiatric Problem:  No


Suicide Risk Score:  0





Integumentary


History of Skin or Integumenta:  No





Blood Transfusions


History of Blood Disorders:  No





Family Medical History


Significant Family History:  No Pertinent Family Hx





Physical Exam


Vital Signs





 VS - Last 72 Hours, by Label








 2/9/18 2/9/18 2/9/18





 19:30 19:43 21:29


 


Temp 97.9  


 


Pulse 101  79


 


Resp 30  19


 


B/P (MAP) 163/78 (106)  


 


Pulse Ox 97 96 100


 


O2 Delivery  Nasal Cannula 


 


O2 Flow Rate  2.00 





Capillary Refill : Less Than 3 Seconds


General Appearance:  no apparent distress, obese, other (ANXIOUS, REEKS OF 

CIGARETTES)


HEENT:  PERRL/EOMI


Neck:  non-tender, full range of motion, supple, normal inspection


Respiratory:  chest non-tender, normal breath sounds, no respiratory distress, 

no accessory muscle use


Cardiovascular:  normal peripheral pulses, regular rate, rhythm, no edema, no 

JVD, no murmur


Peripheral Pulses:  1+ Dorsalis Pedis (R), 1+ Left Dors-Pedis (L)


Gastrointestinal:  normal bowel sounds, non tender, soft


Extremities:  normal range of motion, non-tender, normal inspection, no pedal 

edema, no calf tenderness, normal capillary refill


Back:  normal inspection, no CVA tenderness, no vertebral tenderness


Neurologic/Psychiatric:  CNs II-XII nml as tested, no motor/sensory deficits, 

alert, normal mood/affect, oriented x 3


Skin:  normal color, warm/dry





Progress/Results/Core Measures


Results/Orders


Lab Results





Laboratory Tests








Test


  2/9/18


19:43 Range/Units


 


 


White Blood Count


  9.8 


  4.3-11.0


10^3/uL


 


Red Blood Count


  4.22 L


  4.35-5.85


10^6/uL


 


Hemoglobin 13.9  11.5-16.0  G/DL


 


Hematocrit 40  35-52  %


 


Mean Corpuscular Volume 95  80-99  FL


 


Mean Corpuscular Hemoglobin 33  25-34  PG


 


Mean Corpuscular Hemoglobin


Concent 35 


  32-36  G/DL


 


 


Red Cell Distribution Width 13.6  10.0-14.5  %


 


Platelet Count


  301 


  130-400


10^3/uL


 


Mean Platelet Volume 9.7  7.4-10.4  FL


 


Neutrophils (%) (Auto) 34 L 42-75  %


 


Lymphocytes (%) (Auto) 54 H 12-44  %


 


Monocytes (%) (Auto) 10  0-12  %


 


Eosinophils (%) (Auto) 2  0-10  %


 


Basophils (%) (Auto) 1  0-10  %


 


Neutrophils # (Auto) 3.3  1.8-7.8  X 10^3


 


Lymphocytes # (Auto) 5.3 H 1.0-4.0  X 10^3


 


Monocytes # (Auto) 1.0  0.0-1.0  X 10^3


 


Eosinophils # (Auto)


  0.2 


  0.0-0.3


10^3/uL


 


Basophils # (Auto)


  0.1 


  0.0-0.1


10^3/uL


 


Prothrombin Time 12.2  12.2-14.7  SEC


 


INR Comment 0.9  0.8-1.4  


 


Activated Partial


Thromboplast Time 27 


  24-35  SEC


 


 


Sodium Level 141  135-145  MMOL/L


 


Potassium Level 3.8  3.6-5.0  MMOL/L


 


Chloride Level 109 H   MMOL/L


 


Carbon Dioxide Level 20 L 21-32  MMOL/L


 


Anion Gap 12  5-14  MMOL/L


 


Blood Urea Nitrogen 12  7-18  MG/DL


 


Creatinine


  0.77 


  0.60-1.30


MG/DL


 


Estimat Glomerular Filtration


Rate > 60 


   


 


 


BUN/Creatinine Ratio 16   


 


Glucose Level 110 H   MG/DL


 


Calcium Level 9.1  8.5-10.1  MG/DL


 


Magnesium Level 2.1  1.8-2.4  MG/DL


 


Total Bilirubin 0.3  0.1-1.0  MG/DL


 


Aspartate Amino Transf


(AST/SGOT) 12 


  5-34  U/L


 


 


Alanine Aminotransferase


(ALT/SGPT) 9 


  0-55  U/L


 


 


Alkaline Phosphatase 103    U/L


 


Total Creatine Kinase 97    U/L


 


Creatine Kinase MB 1.7  <6.6  NG/ML


 


Troponin I < 0.30  <0.30  NG/ML


 


B-Type Natriuretic Peptide 19.1  <100.0  PG/ML


 


Total Protein 7.7  6.4-8.2  GM/DL


 


Albumin 4.1  3.2-4.5  GM/DL


 


Amylase Level 49    U/L


 


Lipase 38  8-78  U/L








My Orders





Orders - ALVAREZ YEAGER DO


Amylase (2/9/18 19:34)


Cbc With Automated Diff (2/9/18 19:34)


Comprehensive Metabolic Panel (2/9/18 19:34)


Creatine Kinase (2/9/18 19:34)


Creatine Kinase Mb (2/9/18 19:34)


Lipase (2/9/18 19:34)


Partial Thromboplastin Time (2/9/18 19:34)


Protime With Inr (2/9/18 19:34)


Troponin I (2/9/18 19:34)


Chest 1 View, Ap/Pa Only (2/9/18 19:34)


O2 (2/9/18 19:34)


Ekg Tracing (2/9/18 19:34)


Aspirin Chewable Tablet (Baby Aspirin Ch (2/9/18 19:45)


BNP (2/9/18 19:34)


Monitor-Rhythm Ecg Trace Only (2/9/18 19:34)


Magnesium (2/9/18 19:34)


Aspirin Chewable Tablet (Baby Aspirin Ch (2/9/18 19:45)


Nitroglycerin 0.4 Mg Btl 25's (Nitrostat (2/9/18 19:45)


Ct Chest/Abdomen/Pelvis Wo (2/9/18 20:21)


Pantoprazole Tablet (Protonix Tablet) (2/9/18 21:00)


Rx-Dicyclomine Capsule (Rx-Bentyl Capsul (2/9/18 21:00)


Rx-Hyoscyamine Tab (Rx-Levsin Sl) (2/9/18 21:00)





Vital Signs/I&O





Vital Sign - Last 12Hours








 2/9/18 2/9/18 2/9/18





 19:30 19:43 21:29


 


Temp 97.9  


 


Pulse 101  79


 


Resp 30  19


 


B/P (MAP) 163/78 (106)  


 


Pulse Ox 97 96 100


 


O2 Delivery  Nasal Cannula 


 


O2 Flow Rate  2.00 














Blood Pressure Mean:  106








Progress Note :  


Progress Note


ASYMPTOMATIC IN ER OTHER THAN NAUSEA JUST PRIOR TO DISMISSAL-IS A FREQUENT 

PROBLEM, PER PT. HAS ZOFRAN AT HOME





ECG


Initial ECG Impression Date:  Feb 9, 2018


Initial ECG Impression Time:  19:35


Initial ECG Rate:  91


Initial ECG Rhythm:  Normal Sinus


Initial ECG Impression:  Nonspecific Changes


Initial ECG Comparisson:  No Previous ECG Available





Diagnostic Imaging





Comments


CXR--NO ACUTE PROCESS, PER RADIOLOGIST REPORT @ 2004





CT CHEST/ABDOMEN/PELVIS--NO ACUTE PROCESS, PER RADIOLOGIST REPORT @ 2045


   Reviewed:  Reviewed by Me





Departure


Impression


Impression:  


 Primary Impression:  


 Abdominal pain


Disposition:  01 HOME, SELF-CARE


Condition:  Improved





Departure-Patient Inst.


Referrals:  


ADOLFO ALLEN MD (PCP/Family)


Primary Care Physician


Patient Instructions:  Acute Abdomen (Belly Pain), Adult (DC)





Add. Discharge Instructions:  


CLEAR LIQUIDS FOR 24 HOURS, THEN START A BLAND DIET--NO SPICY, GREASY/HIGH FAT 

OR ACIDIC FOOD OR DRINK





FOLLOW UP WITH YOUR DR IN 3-4 DAYS FOR FURTHER CARE





TAKE YOUR ZOFRAN AS NEEDED FOR NAUSEA





RETURN TO ER IF SYMPTOMS WORSEN





All discharge instructions reviewed with patient and/or family. Voiced 

understanding.


Scripts


Dicyclomine HCl (Dicyclomine HCl) 20 Mg Tablet


20 MG PO Q6H for Abdominal Pain, #20 TAB


   Prov: ALVAREZ YEAGER DO         2/9/18 


Hyoscyamine Sulfate (Levsin-Sl) 0.125 Mg Tab.subl


1-2 TAB SL Q4H for Abdominal Pain, #15 TAB


   Prov: ALVAREZ YEAGER DO         2/9/18 


Pantoprazole Sodium (Protonix) 40 Mg Tablet.dr


40 MG PO DAILY, #15 TAB


   Prov: ALVAREZ YEAGER DO         2/9/18











ALVAREZ YEAGER DO Feb 9, 2018 20:04

## 2018-02-09 NOTE — DIAGNOSTIC IMAGING REPORT
PROCEDURE: CT chest, abdomen, and pelvis without contrast.



TECHNIQUE: Multiple contiguous axial images were obtained through

the chest, abdomen, and pelvis without the use of intravenous

contrast.



INDICATION:  Chest pain. Feels like chest burst open. 



CT chest: The lungs are clear. There is no effusion or

pneumothorax. There is no mediastinal mass or hemorrhage. There

is no aortic aneurysm. Dissection or pulmonary embolus cannot be

evaluated without contrast. There is no bony abnormality.



The liver, gallbladder and bile ducts are normal. The spleen,

pancreas and adrenals are normal. The kidneys, ureters and

bladder are normal. There is diverticulosis of colon with no

evidence of diverticulitis or other acute bowel abnormality.

There is scattered calcifications of the abdominal aorta with no

aneurysm seen. No retroperitoneal hemorrhage is evident. There is

no free intraperitoneal air or fluid. There is no acute bony

abnormality.



IMPRESSION: CT of the chest shows no acute abnormality.



CT of the abdomen and pelvis shows no acute abnormality.



Dictated by: 



  Dictated on workstation # [id number]

## 2018-12-25 ENCOUNTER — HOSPITAL ENCOUNTER (EMERGENCY)
Dept: HOSPITAL 75 - ER | Age: 63
LOS: 1 days | Discharge: HOME | End: 2018-12-26
Payer: MEDICARE

## 2018-12-25 VITALS — WEIGHT: 179 LBS | BODY MASS INDEX: 31.71 KG/M2 | HEIGHT: 63 IN

## 2018-12-25 DIAGNOSIS — I73.9: ICD-10-CM

## 2018-12-25 DIAGNOSIS — Z88.1: ICD-10-CM

## 2018-12-25 DIAGNOSIS — I25.10: ICD-10-CM

## 2018-12-25 DIAGNOSIS — Z86.69: ICD-10-CM

## 2018-12-25 DIAGNOSIS — E78.00: ICD-10-CM

## 2018-12-25 DIAGNOSIS — Z88.5: ICD-10-CM

## 2018-12-25 DIAGNOSIS — Z90.49: ICD-10-CM

## 2018-12-25 DIAGNOSIS — Z91.041: ICD-10-CM

## 2018-12-25 DIAGNOSIS — R51: Primary | ICD-10-CM

## 2018-12-25 DIAGNOSIS — I10: ICD-10-CM

## 2018-12-25 DIAGNOSIS — Z77.22: ICD-10-CM

## 2018-12-25 PROCEDURE — 85025 COMPLETE CBC W/AUTO DIFF WBC: CPT

## 2018-12-25 PROCEDURE — 81000 URINALYSIS NONAUTO W/SCOPE: CPT

## 2018-12-25 PROCEDURE — 93041 RHYTHM ECG TRACING: CPT

## 2018-12-25 PROCEDURE — 36415 COLL VENOUS BLD VENIPUNCTURE: CPT

## 2018-12-25 PROCEDURE — 70450 CT HEAD/BRAIN W/O DYE: CPT

## 2018-12-25 PROCEDURE — 82962 GLUCOSE BLOOD TEST: CPT

## 2018-12-25 PROCEDURE — 85379 FIBRIN DEGRADATION QUANT: CPT

## 2018-12-25 PROCEDURE — 87077 CULTURE AEROBIC IDENTIFY: CPT

## 2018-12-25 PROCEDURE — 80053 COMPREHEN METABOLIC PANEL: CPT

## 2018-12-25 PROCEDURE — 85610 PROTHROMBIN TIME: CPT

## 2018-12-25 PROCEDURE — 71045 X-RAY EXAM CHEST 1 VIEW: CPT

## 2018-12-25 PROCEDURE — 85730 THROMBOPLASTIN TIME PARTIAL: CPT

## 2018-12-25 PROCEDURE — 93005 ELECTROCARDIOGRAM TRACING: CPT

## 2018-12-25 PROCEDURE — 84484 ASSAY OF TROPONIN QUANT: CPT

## 2018-12-25 PROCEDURE — 87088 URINE BACTERIA CULTURE: CPT

## 2018-12-26 VITALS — DIASTOLIC BLOOD PRESSURE: 76 MMHG | SYSTOLIC BLOOD PRESSURE: 156 MMHG

## 2018-12-26 LAB
ALBUMIN SERPL-MCNC: 3.9 GM/DL (ref 3.2–4.5)
ALP SERPL-CCNC: 79 U/L (ref 40–136)
ALT SERPL-CCNC: 10 U/L (ref 0–55)
APTT BLD: 29 SEC (ref 24–35)
APTT PPP: YELLOW S
BACTERIA #/AREA URNS HPF: (no result) /HPF
BASOPHILS # BLD AUTO: 0 10^3/UL (ref 0–0.1)
BASOPHILS NFR BLD AUTO: 1 % (ref 0–10)
BILIRUB SERPL-MCNC: 0.3 MG/DL (ref 0.1–1)
BILIRUB UR QL STRIP: NEGATIVE
BUN/CREAT SERPL: 24
CALCIUM SERPL-MCNC: 8.6 MG/DL (ref 8.5–10.1)
CHLORIDE SERPL-SCNC: 110 MMOL/L (ref 98–107)
CO2 SERPL-SCNC: 21 MMOL/L (ref 21–32)
CREAT SERPL-MCNC: 0.75 MG/DL (ref 0.6–1.3)
D DIMER PPP FEU-MCNC: 1.02 UG/ML (ref 0–0.49)
EOSINOPHIL # BLD AUTO: 0.2 10^3/UL (ref 0–0.3)
EOSINOPHIL NFR BLD AUTO: 2 % (ref 0–10)
ERYTHROCYTE [DISTWIDTH] IN BLOOD BY AUTOMATED COUNT: 14.1 % (ref 10–14.5)
FIBRINOGEN PPP-MCNC: CLEAR MG/DL
GFR SERPLBLD BASED ON 1.73 SQ M-ARVRAT: > 60 ML/MIN
GLUCOSE SERPL-MCNC: 100 MG/DL (ref 70–105)
GLUCOSE UR STRIP-MCNC: NEGATIVE MG/DL
HCT VFR BLD CALC: 38 % (ref 35–52)
HGB BLD-MCNC: 12.6 G/DL (ref 11.5–16)
INR PPP: 1 (ref 0.8–1.4)
KETONES UR QL STRIP: NEGATIVE
LEUKOCYTE ESTERASE UR QL STRIP: (no result)
LYMPHOCYTES # BLD AUTO: 3.4 X 10^3 (ref 1–4)
LYMPHOCYTES NFR BLD AUTO: 40 % (ref 12–44)
MANUAL DIFFERENTIAL PERFORMED BLD QL: NO
MCH RBC QN AUTO: 33 PG (ref 25–34)
MCHC RBC AUTO-ENTMCNC: 34 G/DL (ref 32–36)
MCV RBC AUTO: 97 FL (ref 80–99)
MONOCYTES # BLD AUTO: 1 X 10^3 (ref 0–1)
MONOCYTES NFR BLD AUTO: 12 % (ref 0–12)
NEUTROPHILS # BLD AUTO: 3.9 X 10^3 (ref 1.8–7.8)
NEUTROPHILS NFR BLD AUTO: 46 % (ref 42–75)
NITRITE UR QL STRIP: POSITIVE
PH UR STRIP: 5 [PH] (ref 5–9)
PLATELET # BLD: 286 10^3/UL (ref 130–400)
PMV BLD AUTO: 9.2 FL (ref 7.4–10.4)
POTASSIUM SERPL-SCNC: 3.9 MMOL/L (ref 3.6–5)
PROT SERPL-MCNC: 6.8 GM/DL (ref 6.4–8.2)
PROT UR QL STRIP: NEGATIVE
PROTHROMBIN TIME: 13.6 SEC (ref 12.2–14.7)
RBC # BLD AUTO: 3.87 10^6/UL (ref 4.35–5.85)
RBC #/AREA URNS HPF: (no result) /HPF
SODIUM SERPL-SCNC: 141 MMOL/L (ref 135–145)
SP GR UR STRIP: 1.01 (ref 1.02–1.02)
SQUAMOUS #/AREA URNS HPF: (no result) /HPF
UROBILINOGEN UR-MCNC: NORMAL MG/DL
WBC # BLD AUTO: 8.4 10^3/UL (ref 4.3–11)

## 2018-12-26 NOTE — DIAGNOSTIC IMAGING REPORT
INDICATION: Headache with dizziness. Shortness of breath.



Comparison with 02/09/2018.



FINDINGS: Portable chest. Lungs are well-aerated without air

trapping. There are no infiltrates or masses. The heart is not

enlarged. No pulmonary edema. No pneumothorax or pleural

effusion. No bony abnormalities. 



IMPRESSION: Normal portable chest.



Dictated by: 



  Dictated on workstation # [id number]

## 2018-12-26 NOTE — ED HEADACHE
General


Chief Complaint:  Head/Cervical Problems


Stated Complaint:  POSS STROKE


Nursing Triage Note:  


Pt reports feeling a pop in head, "like a gun going off" and then pt reports 


burning pain in the head.  Pt reports this occured at approximately 1100.  Pt 


also c/o nausea.


Nursing Sepsis Screen:  No Definite Risk





Allergies and Home Medications


Allergies


Coded Allergies:  


     iodine (Verified  Allergy, Severe, 2/9/18)


     hydrocodone (Verified  Allergy, Unknown, 2/9/18)


     morphine (Verified  Allergy, Unknown, 2/9/18)


Uncoded Allergies:  


     IV DYE (Allergy, Severe, 12/10/17)


     MULTIPLE ANTIBIOTICS (Allergy, Unknown, 12/4/16)





Home Medications


Dicyclomine HCl 20 Mg Tablet, 20 MG PO Q6H


   Prescribed by: ALVAREZ YEAGER on 2/9/18 2058


Hyoscyamine Sulfate 0.125 Mg Tab.subl, 1-2 TAB SL Q4H


   Prescribed by: ALVAREZ YEAGER on 2/9/18 2058


Lisinopril 10 Mg Tablet, 10 MG PO DAILY, (Reported)


Pantoprazole Sodium 40 Mg Tablet.dr, 40 MG PO DAILY


   Prescribed by: ALVAREZ YEAGER on 2/9/18 2058





Past Medical-Social-Family Hx


Patient Social History


Alcohol Use:  Denies Use


Recreational Drug Use:  No


Type Used:  Cigarettes


2nd Hand Smoke Exposure:  Yes


Recent Foreign Travel:  No


Contact w/Someone Who Travel:  No


Recent Infectious Disease Expo:  No


Recent Hopitalizations:  No


Physical Abuse:  No


Sexual Abuse:  No





Immunizations Up To Date


Tetanus Booster (TDap):  Unknown





Seasonal Allergies


Seasonal Allergies:  No





Past Medical History


Surgeries:  Yes


Appendectomy, Breast, Cardiac, Orthopedic, Vascular Surgery


Respiratory:  Yes (tobaccoism)


Cardiac:  Yes (CAROTID DISEASE; ILIAC AND AORTIC STENTS)


Coronary Artery Disease, High Cholesterol, Hypertension, Peripheral Vascular


Neurological:  Yes (occasional headaches.)


Headaches /Migraines


GYN History:  Menopausal


Genitourinary:  No


Gastrointestinal:  No


Musculoskeletal:  No


Endocrine:  No


HEENT:  Yes


Cataract


Cancer:  No


Psychosocial:  No


Integumentary:  No


Blood Disorders:  No





Family Medical History


No Pertinent Family Hx





Physical Exam


Vital Signs





Vital Signs - First Documented








 12/25/18





 23:17


 


Temp 98.4


 


Pulse 102


 


Resp 22


 


B/P (MAP) 174/84 (114)


 


Pulse Ox 98


 


O2 Delivery Room Air





Capillary Refill : Less Than 3 Seconds


Height, Weight, BMI


Height: 5'3.00"


Weight: 179lbs. oz. 81.154356cf;  BMI


Method:Stated





Progress/Results/Core Measures


Results/Orders


Lab Results





Laboratory Tests








Test


 12/26/18


00:30 12/26/18


01:28 12/26/18


01:40 Range/Units


 


 


White Blood Count


 8.4 


 


 


 4.3-11.0


10^3/uL


 


Red Blood Count


 3.87 L


 


 


 4.35-5.85


10^6/uL


 


Hemoglobin 12.6    11.5-16.0  G/DL


 


Hematocrit 38    35-52  %


 


Mean Corpuscular Volume 97    80-99  FL


 


Mean Corpuscular Hemoglobin 33    25-34  PG


 


Mean Corpuscular Hemoglobin


Concent 34 


 


 


 32-36  G/DL





 


Red Cell Distribution Width 14.1    10.0-14.5  %


 


Platelet Count


 286 


 


 


 130-400


10^3/uL


 


Mean Platelet Volume 9.2    7.4-10.4  FL


 


Neutrophils (%) (Auto) 46    42-75  %


 


Lymphocytes (%) (Auto) 40    12-44  %


 


Monocytes (%) (Auto) 12    0-12  %


 


Eosinophils (%) (Auto) 2    0-10  %


 


Basophils (%) (Auto) 1    0-10  %


 


Neutrophils # (Auto) 3.9    1.8-7.8  X 10^3


 


Lymphocytes # (Auto) 3.4    1.0-4.0  X 10^3


 


Monocytes # (Auto) 1.0    0.0-1.0  X 10^3


 


Eosinophils # (Auto)


 0.2 


 


 


 0.0-0.3


10^3/uL


 


Basophils # (Auto)


 0.0 


 


 


 0.0-0.1


10^3/uL


 


Prothrombin Time 13.6    12.2-14.7  SEC


 


INR Comment 1.0    0.8-1.4  


 


Activated Partial


Thromboplast Time 29 


 


 


 24-35  SEC





 


D-Dimer


 1.02 H


 


 


 0.00-0.49


UG/ML


 


Sodium Level 141    135-145  MMOL/L


 


Potassium Level 3.9    3.6-5.0  MMOL/L


 


Chloride Level 110 H     MMOL/L


 


Carbon Dioxide Level 21    21-32  MMOL/L


 


Anion Gap 10    5-14  MMOL/L


 


Blood Urea Nitrogen 18    7-18  MG/DL


 


Creatinine


 0.75 


 


 


 0.60-1.30


MG/DL


 


Estimat Glomerular Filtration


Rate > 60 


 


 


  





 


BUN/Creatinine Ratio 24     


 


Glucose Level 100      MG/DL


 


Calcium Level 8.6    8.5-10.1  MG/DL


 


Corrected Calcium 8.7    8.5-10.1  MG/DL


 


Total Bilirubin 0.3    0.1-1.0  MG/DL


 


Aspartate Amino Transf


(AST/SGOT) 12 


 


 


 5-34  U/L





 


Alanine Aminotransferase


(ALT/SGPT) 10 


 


 


 0-55  U/L





 


Alkaline Phosphatase 79      U/L


 


Troponin I < 0.30    <0.30  NG/ML


 


Total Protein 6.8    6.4-8.2  GM/DL


 


Albumin 3.9    3.2-4.5  GM/DL


 


Glucometer  105     MG/DL


 


Urine Color   YELLOW   


 


Urine Clarity   CLEAR   


 


Urine pH   5  5-9  


 


Urine Specific Gravity   1.015 L 1.016-1.022  


 


Urine Protein   NEGATIVE  NEGATIVE  


 


Urine Glucose (UA)   NEGATIVE  NEGATIVE  


 


Urine Ketones   NEGATIVE  NEGATIVE  


 


Urine Nitrite   POSITIVE H NEGATIVE  


 


Urine Bilirubin   NEGATIVE  NEGATIVE  


 


Urine Urobilinogen   NORMAL  NORMAL  MG/DL


 


Urine Leukocyte Esterase   2+ H NEGATIVE  


 


Urine RBC (Auto)   1+ H NEGATIVE  


 


Urine RBC   NONE   /HPF


 


Urine WBC   10-25 H  /HPF


 


Urine Squamous Epithelial


Cells 


 


 0-2 


  /HPF





 


Urine Crystals   NONE   /LPF


 


Urine Bacteria   MODERATE H  /HPF


 


Urine Casts   NONE   /LPF


 


Urine Mucus   NEGATIVE   /LPF


 


Urine Culture Indicated   YES   








My Orders





Orders - ALVAREZ YEAGER DO


Cbc With Automated Diff (12/25/18 23:33)


Protime With Inr (12/25/18 23:33)


Partial Thromboplastin Time (12/25/18 23:33)


Comprehensive Metabolic Panel (12/25/18 23:33)


Fibrin Degradation Products (12/25/18 23:33)


Troponin I (12/25/18 23:33)


Ua Culture If Indicated (12/25/18 23:33)


Chest 1 View, Ap/Pa Only (12/25/18 23:33)


Ekg Tracing (12/25/18 23:33)


Nothing By Mouth (12/26/18 Breakfast)


Accucheck Stat ONCE (12/25/18 23:33)


Saline Lock/Iv-Start (12/25/18 23:33)


Saline Lock/Iv-Start (12/25/18 23:33)


Vital Signs Stroke Patient Q15M (12/25/18 23:33)


Ct Head Wo-R/O Stroke (12/25/18 23:33)


O2 (12/25/18 23:33)


Intake & Output 06,14,22 (12/25/18 23:33)


Monitor-Rhythm Ecg Trace Only (12/25/18 23:33)


Dysphagia Screening Tool (12/25/18 23:33)


Lipid Panel (12/26/18 06:00)


I-Stat Bedside Testing (12/25/18 23:33)


Acetaminophen  Tablet (Tylenol  Tablet) (12/26/18 00:15)


Urine Culture (12/26/18 01:40)





Medications Given in ED





Current Medications








 Medications  Dose


 Ordered  Sig/Henri[place]


 Route  Start Time


 Stop Time Status Last Admin


Dose Admin


 


 Acetaminophen  1,000 mg  ONCE  ONCE


 PO  12/26/18 00:15


 12/26/18 00:16 DC 12/26/18 00:29


1,000 MG








Vital Signs/I&O











 12/25/18





 23:17


 


Temp 98.4


 


Pulse 102


 


Resp 22


 


B/P (MAP) 174/84 (114)


 


Pulse Ox 98


 


O2 Delivery Room Air














Blood Pressure Mean:  114











FSBG Bedside Testing


Finger Stick Blood Glucose:  105





Departure


Impression





 Primary Impression:  


 Acute headache


Disposition:  01 HOME, SELF-CARE


Condition:  Improved





Departure-Patient Inst.


Referrals:  


ADOLFO ALLEN MD (PCP/Family)


Primary Care Physician


Patient Instructions:  Headache, Adult (DC)





Add. Discharge Instructions:  


HOME, REST





TAKE YOUR MEDICATIONS AS PRESCRIBED





TYLENOL AS NEEDED FOR PAIN 





FOLLOW UP WITH DR. ALLEN THIS WEEK FOR FURTHER CARE


KEEP YOUR APPOINTMENT FOR VASCULAR ULTRASOUNDS THIS WEEK





RETURN TO ER IF WORSE





All discharge instructions reviewed with patient and/or family. Voiced 

understanding.











ALVAREZ YEAGER DO Dec 26, 2018 02:04